# Patient Record
Sex: FEMALE | Race: WHITE | Employment: OTHER | URBAN - NONMETROPOLITAN AREA
[De-identification: names, ages, dates, MRNs, and addresses within clinical notes are randomized per-mention and may not be internally consistent; named-entity substitution may affect disease eponyms.]

---

## 2017-03-20 ENCOUNTER — HOSPITAL ENCOUNTER (OUTPATIENT)
Dept: LAB | Age: 62
Discharge: HOME | End: 2017-03-20
Attending: INTERNAL MEDICINE | Admitting: INTERNAL MEDICINE

## 2017-03-20 ENCOUNTER — OFFICE VISIT (OUTPATIENT)
Dept: CARDIOLOGY CLINIC | Age: 62
End: 2017-03-20

## 2017-03-20 VITALS
SYSTOLIC BLOOD PRESSURE: 110 MMHG | RESPIRATION RATE: 16 BRPM | BODY MASS INDEX: 24.43 KG/M2 | HEART RATE: 64 BPM | WEIGHT: 152 LBS | DIASTOLIC BLOOD PRESSURE: 74 MMHG | HEIGHT: 66 IN

## 2017-03-20 DIAGNOSIS — I10 ESSENTIAL HYPERTENSION: ICD-10-CM

## 2017-03-20 DIAGNOSIS — Z98.61 S/P PTCA (PERCUTANEOUS TRANSLUMINAL CORONARY ANGIOPLASTY): Primary | ICD-10-CM

## 2017-03-20 DIAGNOSIS — I70.0 ATHEROSCLEROSIS OF AORTIC ARCH (HCC): ICD-10-CM

## 2017-03-20 DIAGNOSIS — E78.2 MIXED HYPERLIPIDEMIA: ICD-10-CM

## 2017-03-20 LAB
ALBUMIN SERPL-MCNC: 4.2 GM/DL (ref 3.4–5)
ALP BLD-CCNC: 105 IU/L (ref 40–129)
ALT SERPL-CCNC: 11 U/L (ref 10–40)
ANION GAP SERPL CALCULATED.3IONS-SCNC: 8 MMOL/L (ref 4–16)
AST SERPL-CCNC: 16 IU/L (ref 15–37)
BILIRUB SERPL-MCNC: 0.3 MG/DL (ref 0–1)
BUN BLDV-MCNC: 11 MG/DL (ref 6–23)
CALCIUM SERPL-MCNC: 9.5 MG/DL (ref 8.3–10.6)
CHLORIDE BLD-SCNC: 102 MMOL/L (ref 99–110)
CHOLESTEROL: 149 MG/DL
CO2: 30 MMOL/L (ref 21–32)
CREAT SERPL-MCNC: 0.9 MG/DL (ref 0.6–1.1)
GFR AFRICAN AMERICAN: >60 ML/MIN/1.73M2
GFR NON-AFRICAN AMERICAN: >60 ML/MIN/1.73M2
GLUCOSE BLD-MCNC: 96 MG/DL (ref 70–140)
HDLC SERPL-MCNC: 58 MG/DL
LDL CHOLESTEROL DIRECT: 82 MG/DL
POTASSIUM SERPL-SCNC: 3.7 MMOL/L (ref 3.5–5.1)
SODIUM BLD-SCNC: 140 MMOL/L (ref 135–145)
TOTAL PROTEIN: 7.4 GM/DL (ref 6.4–8.2)
TRIGL SERPL-MCNC: 140 MG/DL

## 2017-03-20 PROCEDURE — 99214 OFFICE O/P EST MOD 30 MIN: CPT | Performed by: INTERNAL MEDICINE

## 2017-03-20 RX ORDER — SIMVASTATIN 40 MG
TABLET ORAL
Qty: 30 TABLET | Refills: 6 | Status: SHIPPED | OUTPATIENT
Start: 2017-03-20 | End: 2017-09-25 | Stop reason: SDUPTHER

## 2017-03-20 RX ORDER — LISINOPRIL 2.5 MG/1
TABLET ORAL
Qty: 30 TABLET | Refills: 6 | Status: SHIPPED | OUTPATIENT
Start: 2017-03-20 | End: 2017-09-25 | Stop reason: SDUPTHER

## 2017-09-25 ENCOUNTER — OFFICE VISIT (OUTPATIENT)
Dept: CARDIOLOGY CLINIC | Age: 62
End: 2017-09-25

## 2017-09-25 VITALS
RESPIRATION RATE: 16 BRPM | SYSTOLIC BLOOD PRESSURE: 120 MMHG | HEART RATE: 72 BPM | WEIGHT: 158 LBS | HEIGHT: 66 IN | DIASTOLIC BLOOD PRESSURE: 60 MMHG | BODY MASS INDEX: 25.39 KG/M2

## 2017-09-25 DIAGNOSIS — I10 ESSENTIAL HYPERTENSION: ICD-10-CM

## 2017-09-25 DIAGNOSIS — I25.10 CORONARY ARTERY DISEASE INVOLVING NATIVE CORONARY ARTERY OF NATIVE HEART WITHOUT ANGINA PECTORIS: ICD-10-CM

## 2017-09-25 DIAGNOSIS — E78.2 MIXED HYPERLIPIDEMIA: ICD-10-CM

## 2017-09-25 DIAGNOSIS — Z98.61 S/P PTCA (PERCUTANEOUS TRANSLUMINAL CORONARY ANGIOPLASTY): Primary | ICD-10-CM

## 2017-09-25 PROCEDURE — 99214 OFFICE O/P EST MOD 30 MIN: CPT | Performed by: INTERNAL MEDICINE

## 2017-09-25 RX ORDER — SIMVASTATIN 40 MG
TABLET ORAL
Qty: 90 TABLET | Refills: 3 | Status: SHIPPED | OUTPATIENT
Start: 2017-09-25 | End: 2018-10-08 | Stop reason: SDUPTHER

## 2017-09-25 RX ORDER — LISINOPRIL 2.5 MG/1
TABLET ORAL
Qty: 90 TABLET | Refills: 3 | Status: SHIPPED | OUTPATIENT
Start: 2017-09-25 | End: 2018-08-29 | Stop reason: SDUPTHER

## 2017-11-30 ENCOUNTER — OFFICE VISIT (OUTPATIENT)
Dept: FAMILY MEDICINE CLINIC | Age: 62
End: 2017-11-30

## 2017-11-30 VITALS
WEIGHT: 159 LBS | DIASTOLIC BLOOD PRESSURE: 78 MMHG | OXYGEN SATURATION: 98 % | TEMPERATURE: 98.1 F | BODY MASS INDEX: 25.66 KG/M2 | HEART RATE: 68 BPM | SYSTOLIC BLOOD PRESSURE: 114 MMHG | RESPIRATION RATE: 15 BRPM

## 2017-11-30 DIAGNOSIS — H65.02 ACUTE SEROUS OTITIS MEDIA OF LEFT EAR, RECURRENCE NOT SPECIFIED: Primary | ICD-10-CM

## 2017-11-30 PROCEDURE — 99213 OFFICE O/P EST LOW 20 MIN: CPT | Performed by: NURSE PRACTITIONER

## 2017-11-30 RX ORDER — AZITHROMYCIN 250 MG/1
TABLET, FILM COATED ORAL
Qty: 1 PACKET | Refills: 0 | Status: SHIPPED | OUTPATIENT
Start: 2017-11-30 | End: 2017-12-10

## 2017-11-30 RX ORDER — FLUTICASONE PROPIONATE 50 MCG
2 SPRAY, SUSPENSION (ML) NASAL DAILY
Qty: 1 BOTTLE | Refills: 0 | Status: SHIPPED | OUTPATIENT
Start: 2017-11-30 | End: 2018-11-05

## 2017-11-30 ASSESSMENT — PATIENT HEALTH QUESTIONNAIRE - PHQ9
1. LITTLE INTEREST OR PLEASURE IN DOING THINGS: 0
SUM OF ALL RESPONSES TO PHQ QUESTIONS 1-9: 0
SUM OF ALL RESPONSES TO PHQ9 QUESTIONS 1 & 2: 0

## 2017-11-30 ASSESSMENT — ENCOUNTER SYMPTOMS
GASTROINTESTINAL NEGATIVE: 1
DIARRHEA: 0
SINUS PRESSURE: 1
VOMITING: 0
COUGH: 0
SINUS PAIN: 1
RESPIRATORY NEGATIVE: 1
SORE THROAT: 1
ABDOMINAL PAIN: 0

## 2018-06-12 ENCOUNTER — TELEPHONE (OUTPATIENT)
Dept: FAMILY MEDICINE CLINIC | Age: 63
End: 2018-06-12

## 2018-08-29 RX ORDER — LISINOPRIL 2.5 MG/1
TABLET ORAL
Qty: 90 TABLET | Refills: 3 | Status: SHIPPED | OUTPATIENT
Start: 2018-08-29 | End: 2018-11-05 | Stop reason: SDUPTHER

## 2018-10-08 RX ORDER — SIMVASTATIN 40 MG
TABLET ORAL
Qty: 30 TABLET | Refills: 0 | Status: SHIPPED | OUTPATIENT
Start: 2018-10-08 | End: 2018-11-05 | Stop reason: SDUPTHER

## 2018-11-05 ENCOUNTER — OFFICE VISIT (OUTPATIENT)
Dept: CARDIOLOGY CLINIC | Age: 63
End: 2018-11-05
Payer: COMMERCIAL

## 2018-11-05 VITALS
BODY MASS INDEX: 25.71 KG/M2 | WEIGHT: 160 LBS | HEART RATE: 78 BPM | HEIGHT: 66 IN | DIASTOLIC BLOOD PRESSURE: 72 MMHG | SYSTOLIC BLOOD PRESSURE: 110 MMHG

## 2018-11-05 DIAGNOSIS — Z98.61 S/P PTCA (PERCUTANEOUS TRANSLUMINAL CORONARY ANGIOPLASTY): Primary | ICD-10-CM

## 2018-11-05 DIAGNOSIS — I10 ESSENTIAL HYPERTENSION: ICD-10-CM

## 2018-11-05 DIAGNOSIS — E78.2 MIXED HYPERLIPIDEMIA: ICD-10-CM

## 2018-11-05 PROCEDURE — 99214 OFFICE O/P EST MOD 30 MIN: CPT | Performed by: INTERNAL MEDICINE

## 2018-11-05 RX ORDER — LISINOPRIL 2.5 MG/1
TABLET ORAL
Qty: 90 TABLET | Refills: 3 | OUTPATIENT
Start: 2018-11-05

## 2018-11-05 RX ORDER — LISINOPRIL 2.5 MG/1
2.5 TABLET ORAL DAILY
Qty: 90 TABLET | Refills: 3 | Status: SHIPPED | OUTPATIENT
Start: 2018-11-05 | End: 2019-10-28 | Stop reason: SDUPTHER

## 2018-11-05 RX ORDER — SIMVASTATIN 40 MG
TABLET ORAL
Qty: 90 TABLET | Refills: 3 | Status: SHIPPED | OUTPATIENT
Start: 2018-11-05 | End: 2019-10-28 | Stop reason: SDUPTHER

## 2018-11-05 NOTE — PROGRESS NOTES
Gokul Mo  1955  Moises Sharp MD    Chief complaint and HPI:  Gokul Mo  78-year-old female follows up for coronary artery disease, hypertension and hyperlipidemia. She denies any chest pains. She walks briskly whenever she walks and she worked as a  and requires quite a bit of walking  at work. she is not a smoker. She never had chest pain. Her presenting symptoms prior to coronary intervention was shortness of breath on walking flights of stairs which she does it without any symptoms now. She has declined stress test in the past.  She continues to decline it as she is feeling great. All her medications are reviewed. She had not had lipids done for over a year. She seems to be compliant to her medications. Rest of the Cardiovascular system review is otherwise unchanged from prior encounter. Past medical history:  has a past medical history of Anxiety; CAD (coronary artery disease); H/O cardiovascular stress test; H/O echocardiogram; H/O echocardiogram; Hyperlipidemia; Hypertension; MI (myocardial infarction) ([address] S/P PTCA (percutaneous transluminal coronary angioplasty); and Tobacco abuse. Past surgical history:  has a past surgical history that includes Coronary angioplasty with stent; Coronary angioplasty; Hysterectomy; Tubal ligation; Diagnostic Cardiac Cath Lab Procedure; and Hemorrhoid surgery. Social History:   Social History   Substance Use Topics    Smoking status: Former Smoker     Packs/day: 0.50     Years: 25.00     Types: Cigarettes     Start date: 5/6/2014     Quit date: 9/30/2016    Smokeless tobacco: Never Used      Comment: Currently using vapor        Reviewed 9/25/17    Alcohol use Yes      Comment: occasionally     Family history: family history includes Substance Abuse in her mother. ALLERGIES:  Penicillins  Prior to Admission medications    Medication Sig Start Date End Date Taking?  Authorizing Provider   simvastatin (ZOCOR) 40 MG tablet

## 2018-11-05 NOTE — PATIENT INSTRUCTIONS
Continue current cardiovascular medications which have been reviewed and discussed individually with you. Continue to walk for exercise. Repeat lipids. Primary/secondary prevention is the goal by aggressive risk modification, healthy and therapeutic life style changes for cardiovascular risk reduction. Various goals are discussed and questions answered. Declines stress test for now. Appropriate prescriptions if needed on this visit are addressed. After visit summery is provided. Questions answered and patient verbalizes understanding. Follow up in office in 12 months with ECG, sooner if needed.

## 2019-04-24 ENCOUNTER — TELEPHONE (OUTPATIENT)
Dept: FAMILY MEDICINE CLINIC | Age: 64
End: 2019-04-24

## 2019-04-24 NOTE — TELEPHONE ENCOUNTER
Attempted to contact pt regarding scheduling a nurse visit for pneumococcal vaccine. No answer, phone just rang busy.

## 2019-10-03 ENCOUNTER — HOSPITAL ENCOUNTER (EMERGENCY)
Age: 64
Discharge: HOME | End: 2019-10-03
Attending: EMERGENCY MEDICINE
Payer: COMMERCIAL

## 2019-10-03 VITALS
HEART RATE: 101 BPM | TEMPERATURE: 99.3 F | RESPIRATION RATE: 18 BRPM | OXYGEN SATURATION: 97 % | SYSTOLIC BLOOD PRESSURE: 177 MMHG | HEIGHT: 66 IN | WEIGHT: 168 LBS | BODY MASS INDEX: 27 KG/M2 | DIASTOLIC BLOOD PRESSURE: 95 MMHG

## 2019-10-03 DIAGNOSIS — S61.112A LACERATION OF LEFT THUMB WITHOUT FOREIGN BODY WITH DAMAGE TO NAIL, INITIAL ENCOUNTER: Primary | ICD-10-CM

## 2019-10-03 PROCEDURE — 6360000002 HC RX W HCPCS: Performed by: EMERGENCY MEDICINE

## 2019-10-03 PROCEDURE — 90715 TDAP VACCINE 7 YRS/> IM: CPT | Performed by: EMERGENCY MEDICINE

## 2019-10-03 PROCEDURE — 90471 IMMUNIZATION ADMIN: CPT | Performed by: EMERGENCY MEDICINE

## 2019-10-03 PROCEDURE — 4500000027

## 2019-10-03 PROCEDURE — 2500000003 HC RX 250 WO HCPCS: Performed by: EMERGENCY MEDICINE

## 2019-10-03 PROCEDURE — 99282 EMERGENCY DEPT VISIT SF MDM: CPT

## 2019-10-03 RX ORDER — DIAPER,BRIEF,INFANT-TODD,DISP
EACH MISCELLANEOUS ONCE
Status: DISCONTINUED | OUTPATIENT
Start: 2019-10-03 | End: 2019-10-03 | Stop reason: HOSPADM

## 2019-10-03 RX ORDER — BUPIVACAINE HYDROCHLORIDE 5 MG/ML
5 INJECTION, SOLUTION EPIDURAL; INTRACAUDAL ONCE
Status: COMPLETED | OUTPATIENT
Start: 2019-10-03 | End: 2019-10-03

## 2019-10-03 RX ADMIN — TETANUS TOXOID, REDUCED DIPHTHERIA TOXOID AND ACELLULAR PERTUSSIS VACCINE, ADSORBED 0.5 ML: 5; 2.5; 8; 8; 2.5 SUSPENSION INTRAMUSCULAR at 20:08

## 2019-10-03 ASSESSMENT — PAIN SCALES - GENERAL
PAINLEVEL_OUTOF10: 0
PAINLEVEL_OUTOF10: 7

## 2019-10-03 ASSESSMENT — PAIN DESCRIPTION - LOCATION: LOCATION: FINGER (COMMENT WHICH ONE)

## 2019-10-03 ASSESSMENT — PAIN DESCRIPTION - DESCRIPTORS: DESCRIPTORS: THROBBING

## 2019-10-03 ASSESSMENT — PAIN DESCRIPTION - FREQUENCY: FREQUENCY: CONTINUOUS

## 2019-10-03 ASSESSMENT — PAIN DESCRIPTION - PAIN TYPE: TYPE: ACUTE PAIN

## 2019-10-28 RX ORDER — SIMVASTATIN 40 MG
TABLET ORAL
Qty: 30 TABLET | Refills: 11 | Status: SHIPPED | OUTPATIENT
Start: 2019-10-28 | End: 2020-10-20 | Stop reason: SDUPTHER

## 2019-10-28 RX ORDER — LISINOPRIL 2.5 MG/1
TABLET ORAL
Qty: 30 TABLET | Refills: 11 | Status: SHIPPED | OUTPATIENT
Start: 2019-10-28 | End: 2020-10-26 | Stop reason: SDUPTHER

## 2019-11-04 ENCOUNTER — OFFICE VISIT (OUTPATIENT)
Dept: CARDIOLOGY CLINIC | Age: 64
End: 2019-11-04
Payer: COMMERCIAL

## 2019-11-04 VITALS
WEIGHT: 165 LBS | SYSTOLIC BLOOD PRESSURE: 110 MMHG | DIASTOLIC BLOOD PRESSURE: 70 MMHG | BODY MASS INDEX: 26.52 KG/M2 | HEART RATE: 80 BPM | HEIGHT: 66 IN

## 2019-11-04 DIAGNOSIS — I25.10 CORONARY ARTERY DISEASE INVOLVING NATIVE CORONARY ARTERY OF NATIVE HEART WITHOUT ANGINA PECTORIS: ICD-10-CM

## 2019-11-04 DIAGNOSIS — I20.8 ANGINA AT REST (HCC): ICD-10-CM

## 2019-11-04 DIAGNOSIS — I10 ESSENTIAL HYPERTENSION: ICD-10-CM

## 2019-11-04 DIAGNOSIS — Z98.61 S/P PTCA (PERCUTANEOUS TRANSLUMINAL CORONARY ANGIOPLASTY): Primary | ICD-10-CM

## 2019-11-04 DIAGNOSIS — Z72.0 TOBACCO ABUSE: ICD-10-CM

## 2019-11-04 DIAGNOSIS — E78.2 MIXED HYPERLIPIDEMIA: ICD-10-CM

## 2019-11-04 PROCEDURE — 93000 ELECTROCARDIOGRAM COMPLETE: CPT | Performed by: INTERNAL MEDICINE

## 2019-11-04 PROCEDURE — 99214 OFFICE O/P EST MOD 30 MIN: CPT | Performed by: INTERNAL MEDICINE

## 2019-11-04 RX ORDER — NITROGLYCERIN 0.4 MG/1
0.4 TABLET SUBLINGUAL EVERY 5 MIN PRN
Qty: 25 TABLET | Refills: 3 | Status: SHIPPED | OUTPATIENT
Start: 2019-11-04

## 2020-05-04 ENCOUNTER — TELEMEDICINE (OUTPATIENT)
Dept: CARDIOLOGY CLINIC | Age: 65
End: 2020-05-04
Payer: COMMERCIAL

## 2020-05-04 VITALS — BODY MASS INDEX: 25.71 KG/M2 | WEIGHT: 160 LBS | HEIGHT: 66 IN

## 2020-05-04 PROCEDURE — 99214 OFFICE O/P EST MOD 30 MIN: CPT | Performed by: INTERNAL MEDICINE

## 2020-05-04 NOTE — ASSESSMENT & PLAN NOTE
Well controlled on current medications, reviewed individually with patient. Had not had lipids done since 2017. I have placed our nurse in the system and she will have it done as soon as possible.

## 2020-05-04 NOTE — PROGRESS NOTES
2020    TELEHEALTH EVALUATION -- Audio/Visual (During YDMLN-78 public health emergency)    HPI:  William Valenzuela (:  1955) has requested an audio/video evaluation for coronary artery disease, hyper lipidemia and hypertension and tobacco abuse. She reports she quit smoking 3 weeks ago after having a spell of common cold. She has done this before and went back on it and she is hoping that she doesn't relapse. Denies any chest pain or shortness of breath. Denies any fevers chills. Has been compliant to her medications. She has retired since last visit. All her medications are reviewed. Prior to Visit Medications    Medication Sig Taking? Authorizing Provider   metoprolol tartrate (LOPRESSOR) 25 MG tablet TAKE 1/2 TABLET BY MOUTH TWICE A DAY.  Yes Ami Falling, MD   nitroGLYCERIN (NITROSTAT) 0.4 MG SL tablet Place 1 tablet under the tongue every 5 minutes as needed for Chest pain Yes Ami Falling, MD   lisinopril (PRINIVIL;ZESTRIL) 2.5 MG tablet TAKE 1 TABLET BY MOUTH EVERY DAY Yes Ami Falling, MD   simvastatin (ZOCOR) 40 MG tablet TAKE 1 TABLET BY MOUTH EVERY DAY AT NIGHT Yes Ami Falling, MD   aspirin 81 MG tablet Take 162 mg by mouth daily Yes Historical Provider, MD     Social History     Tobacco Use    Smoking status: Current Every Day Smoker     Packs/day: 0.50     Years: 25.00     Pack years: 12.50     Types: Cigarettes     Start date: 2014     Last attempt to quit: 2016     Years since quitting: 3.5    Smokeless tobacco: Never Used    Tobacco comment: Currently using vapor        Reviewed 17   Substance Use Topics    Alcohol use: Yes     Comment: occasionally     PHYSICAL EXAMINATION:  [ INSTRUCTIONS:  \"[x]\" Indicates a positive item  \"[]\" Indicates a negative item  -- DELETE ALL ITEMS NOT EXAMINED]  Vital Signs: (As obtained by patient/caregiver or practitioner observation)  Ht 5' 6\" (1.676 m)   Wt 160 lb (72.6 kg)   BMI 25.82 kg/m²       Wt Readings from

## 2020-10-20 RX ORDER — SIMVASTATIN 40 MG
TABLET ORAL
Qty: 30 TABLET | Refills: 6 | Status: SHIPPED | OUTPATIENT
Start: 2020-10-20 | End: 2021-04-20

## 2020-10-20 NOTE — TELEPHONE ENCOUNTER
eRx to [facility], [place]:  Simvastatin 40 mg qd #30 Rx6 pended and routed to Dr. Benjamin Alberto for approval.

## 2020-10-26 RX ORDER — LISINOPRIL 2.5 MG/1
TABLET ORAL
Qty: 30 TABLET | Refills: 11 | Status: SHIPPED | OUTPATIENT
Start: 2020-10-26 | End: 2021-09-20

## 2020-11-09 ENCOUNTER — TELEMEDICINE (OUTPATIENT)
Dept: CARDIOLOGY CLINIC | Age: 65
End: 2020-11-09
Payer: COMMERCIAL

## 2020-11-09 PROCEDURE — 99214 OFFICE O/P EST MOD 30 MIN: CPT | Performed by: INTERNAL MEDICINE

## 2020-11-09 NOTE — ASSESSMENT & PLAN NOTE
Patient is not monitoring blood pressure at home. Start monitoring blood pressure. Goal is to keep it below 140/90.

## 2020-11-09 NOTE — ASSESSMENT & PLAN NOTE
We do not have any recent lipid analysis results on her. Patient is taking pravastatin. I would order lipid analysis and LFTs in chart check. Continue current lipid-lowering therapy for now. LDL goal is less than 70.

## 2020-11-09 NOTE — PATIENT INSTRUCTIONS
Continue current cardiovascular medications which have been reviewed and discussed individually with you. Counseled for regular exercise. Primary/secondary prevention is the goal by aggressive risk modification, healthy and therapeutic life style changes for cardiovascular risk reduction. Various goals are discussed and questions answered. Multiple questions are answered and patient verbalized understanding. Follow up office visit in 6 months, sooner if needed.

## 2020-11-09 NOTE — PROGRESS NOTES
Packs/day: 0.50     Years: 25.00     Pack years: 12.50     Types: Cigarettes     Start date: 2014     Last attempt to quit: 2016     Years since quittin.1    Smokeless tobacco: Never Used    Tobacco comment: Currently using vapor        Reviewed 17   Substance Use Topics    Alcohol use: Yes     Comment: occasionally     PHYSICAL EXAMINATION:  [ INSTRUCTIONS:  \"[x]\" Indicates a positive item  \"[]\" Indicates a negative item  -- DELETE ALL ITEMS NOT EXAMINED]  Vital Signs: (As obtained by patient/caregiver or practitioner observation)  Patient-Reported Vitals 2020   Patient-Reported Weight 160 lbs   Patient-Reported Height 5 6       Wt Readings from Last 3 Encounters:   20 160 lb (72.6 kg)   19 165 lb (74.8 kg)   10/03/19 168 lb (76.2 kg)     Constitutional: [x] Appears well-developed and well-nourished [x] No apparent distress      [] Abnormal-   Mental status  [x] Alert and awake  [x] Oriented to person/place/time []Able to follow commands      Eyes:  EOM    [x]  Normal  [] Abnormal-  Sclera  []  Normal  [] Abnormal -         Discharge []  None visible  [] Abnormal -    HENT:   [x] Normocephalic, atraumatic.   [] Abnormal   [] Mouth/Throat: Mucous membranes are moist.     External Ears [x] Normal  [] Abnormal-     Pulmonary/Chest: [x] Respiratory effort normal.  [] No visualized signs of difficulty breathing or respiratory distress        [] Abnormal-      Neurological:        [x] No Facial Asymmetry (Cranial nerve 7 motor function) (limited exam to video visit)          [] No gaze palsy        [] Abnormal-         Skin:        [x] No significant exanthematous lesions or discoloration noted on facial skin         [] Abnormal-            Psychiatric:       [x] Normal Affect [] No Hallucinations        [] Abnormal-   Other pertinent observable physical exam findings-     Due to this being a TeleHealth encounter, evaluation of the following organ systems is limited: the patient's risk of exposure to COVID-19 and provide continuity of care for an established patient. Lipid analysis and LFT. Services were provided through a video synchronous discussion virtually to substitute for in-person clinic visit.

## 2021-04-20 NOTE — TELEPHONE ENCOUNTER
Pt given 30 day supply and informed that they need labs done before next ov 5/3/2021. Pt agreed to have labs done.

## 2021-04-21 RX ORDER — SIMVASTATIN 40 MG
TABLET ORAL
Qty: 30 TABLET | Refills: 0 | Status: SHIPPED | OUTPATIENT
Start: 2021-04-21 | End: 2021-05-17

## 2021-04-30 ENCOUNTER — HOSPITAL ENCOUNTER (OUTPATIENT)
Age: 66
Discharge: HOME | End: 2021-04-30

## 2021-04-30 LAB
ALBUMIN SERPL-MCNC: 4 GM/DL (ref 3.4–5)
ALP BLD-CCNC: 121 IU/L (ref 40–129)
ALT SERPL-CCNC: 8 U/L (ref 10–40)
AST SERPL-CCNC: 14 IU/L (ref 15–37)
BILIRUB SERPL-MCNC: 0.6 MG/DL (ref 0–1)
BILIRUBIN DIRECT: 0.2 MG/DL (ref 0–0.3)
BILIRUBIN, INDIRECT: 0.4 MG/DL (ref 0–0.7)
CHOLESTEROL, FASTING: 145 MG/DL
HDLC SERPL-MCNC: 40 MG/DL
LDL CHOLESTEROL DIRECT: 89 MG/DL
TOTAL PROTEIN: 7.3 GM/DL (ref 6.4–8.2)
TRIGLYCERIDE, FASTING: 144 MG/DL

## 2021-04-30 PROCEDURE — 80076 HEPATIC FUNCTION PANEL: CPT

## 2021-04-30 PROCEDURE — 36415 COLL VENOUS BLD VENIPUNCTURE: CPT

## 2021-04-30 PROCEDURE — 80061 LIPID PANEL: CPT

## 2021-05-10 ENCOUNTER — TELEPHONE (OUTPATIENT)
Dept: CARDIOLOGY CLINIC | Age: 66
End: 2021-05-10

## 2021-05-10 NOTE — TELEPHONE ENCOUNTER
I called patient to let her know she missed ov and to have her reschedule appointment. I had to leave message for patient to call the office.

## 2021-05-17 RX ORDER — SIMVASTATIN 40 MG
TABLET ORAL
Qty: 30 TABLET | Refills: 5 | Status: SHIPPED | OUTPATIENT
Start: 2021-05-17 | End: 2021-09-20

## 2021-09-20 RX ORDER — SIMVASTATIN 40 MG
TABLET ORAL
Qty: 90 TABLET | Refills: 0 | Status: SHIPPED | OUTPATIENT
Start: 2021-09-20 | End: 2021-10-12 | Stop reason: ALTCHOICE

## 2021-09-20 RX ORDER — LISINOPRIL 2.5 MG/1
TABLET ORAL
Qty: 90 TABLET | Refills: 0 | Status: SHIPPED | OUTPATIENT
Start: 2021-09-20 | End: 2021-12-13

## 2021-09-20 NOTE — TELEPHONE ENCOUNTER
Will pend prescription to Dr Lizzy Iglesias to review and approve. Patient last visit was VV 11/2020. Patient cancelled last office visit due to no longer having insurance. Did reschedule office visit and patient will stop in the office to  financial assistance form prior to appointment.

## 2021-10-12 ENCOUNTER — OFFICE VISIT (OUTPATIENT)
Dept: CARDIOLOGY CLINIC | Age: 66
End: 2021-10-12

## 2021-10-12 VITALS
HEART RATE: 79 BPM | BODY MASS INDEX: 24.27 KG/M2 | HEIGHT: 66 IN | DIASTOLIC BLOOD PRESSURE: 82 MMHG | WEIGHT: 151 LBS | SYSTOLIC BLOOD PRESSURE: 136 MMHG

## 2021-10-12 DIAGNOSIS — Z72.0 TOBACCO ABUSE: ICD-10-CM

## 2021-10-12 DIAGNOSIS — I10 PRIMARY HYPERTENSION: ICD-10-CM

## 2021-10-12 DIAGNOSIS — E78.00 PURE HYPERCHOLESTEROLEMIA: ICD-10-CM

## 2021-10-12 DIAGNOSIS — R63.4 WEIGHT LOSS, UNINTENTIONAL: ICD-10-CM

## 2021-10-12 DIAGNOSIS — Z98.61 S/P PTCA (PERCUTANEOUS TRANSLUMINAL CORONARY ANGIOPLASTY): ICD-10-CM

## 2021-10-12 DIAGNOSIS — I25.10 CORONARY ARTERY DISEASE INVOLVING NATIVE HEART WITHOUT ANGINA PECTORIS, UNSPECIFIED VESSEL OR LESION TYPE: Primary | ICD-10-CM

## 2021-10-12 PROCEDURE — 99214 OFFICE O/P EST MOD 30 MIN: CPT | Performed by: INTERNAL MEDICINE

## 2021-10-12 PROCEDURE — 93000 ELECTROCARDIOGRAM COMPLETE: CPT | Performed by: INTERNAL MEDICINE

## 2021-10-12 RX ORDER — ATORVASTATIN CALCIUM 80 MG/1
80 TABLET, FILM COATED ORAL DAILY
Qty: 90 TABLET | Refills: 1 | Status: SHIPPED | OUTPATIENT
Start: 2021-10-12 | End: 2022-04-11 | Stop reason: SDUPTHER

## 2021-10-12 NOTE — PATIENT INSTRUCTIONS
Change Simvastatin to Lipitor 80 mg daily if tolerated and repeat lipids and LFT in 2-3 months. LDL goal is below 70. Primary/secondary prevention is the goal by aggressive risk modification, healthy and therapeutic life style changes for cardiovascular risk reduction. Various goals are discussed and questions answered. Continue other current cardiovascular medications which have been reviewed and discussed individually with you. Follow up with PCP for unintentional weight loss.  Follow-up in 6 months, sooner if needed

## 2021-10-12 NOTE — PROGRESS NOTES
Talia Crawford (:  1955) is a 74 y.o. female,     Chief Complaint   Patient presents with    Coronary Artery Disease     OV for 1 year check. Pt denies any chest pain,SOB,dizziness,swelling to ankles, or palpitations.  Hypertension    Hyperlipidemia     Patient is here for follow up for known premature coronary artery disease status post multivessel interventions in  has hypertension hyperlipidemia and tobacco abuse. She denies any chest pain or any cardiac symptoms. She continues to smoke half a pack of cigarettes a day and has not taken vaccination for COVID-19. She is taking care of her 80year-old father and it has been under stress because he is  and extremely hard of hearing. Medications are reviewed and she is compliant to her medications. Allergies   Allergen Reactions    Penicillins Anaphylaxis and Shortness Of Breath     Prior to Admission medications    Medication Sig Start Date End Date Taking?  Authorizing Provider   atorvastatin (LIPITOR) 80 MG tablet Take 1 tablet by mouth daily 10/12/21  Yes Osbaldo Robert MD   lisinopril (PRINIVIL;ZESTRIL) 2.5 MG tablet TAKE 1 TABLET BY MOUTH EVERY DAY 21  Yes Osbaldo Robert MD   metoprolol tartrate (LOPRESSOR) 25 MG tablet TAKE 1/2 TABLET BY MOUTH TWICE A DAY 21  Yes Osbaldo Robert MD   nitroGLYCERIN (NITROSTAT) 0.4 MG SL tablet Place 1 tablet under the tongue every 5 minutes as needed for Chest pain 19  Yes Osbaldo Robert MD   aspirin 81 MG tablet Take 162 mg by mouth daily   Yes Historical Provider, MD     Past Medical History:   Diagnosis Date    Anxiety     CAD (coronary artery disease)     H/O cardiovascular stress test 2016    WNL EF 70%    H/O echocardiogram 1/28/10    WNL EF 55-60%    H/O echocardiogram 2016    AV sclerosis without stenosis    Hyperlipidemia     Hypertension     MI (myocardial infarction) ([facility] [address]     S/P PTCA (percutaneous transluminal coronary angioplasty) modification for secondary prevention. Last stress test was September 2016. Guidelines recommend direct evaluation once in 5 years however because of insurance she did not want the test done. 3. Tobacco abuse  Assessment & Plan:  Patient is counseled for smoking cessation and refraining from passive nicotine exposure. I have offered multiple approaches and support to accomplish this. 4. Pure hypercholesterolemia  Assessment & Plan:  Change Simvastatin to Lipitor 80 mg daily if tolerated and repeat lipids and LFT in 2-3 months. LDL goal is below 70. Orders:  -     Lipid Panel; Standing  -     Hepatic Function Panel; Future  5. Primary hypertension  Assessment & Plan:  Well controlled on current medications, reviewed individually with patient. 6. Weight loss, unintentional  Assessment & Plan:  Follow up with PCP for unintentional weight loss     Change Simvastatin to Lipitor 80 mg daily if tolerated and repeat lipids and LFT in 2-3 months. LDL goal is below 70. Primary/secondary prevention is the goal by aggressive risk modification, healthy and therapeutic life style changes for cardiovascular risk reduction. Various goals are discussed and questions answered. Continue other current cardiovascular medications which have been reviewed and discussed individually with you. Follow up with PCP for unintentional weight loss. Follow-up in 6 months, sooner if needed. An electronic signature was used to authenticate this note.     --Marlen Sousa MD

## 2021-10-12 NOTE — ASSESSMENT & PLAN NOTE
Clinically stable. Continue aggressive risk factor modification for secondary prevention. Last stress test was September 2016. Guidelines recommend direct evaluation once in 5 years however because of insurance she did not want the test done.

## 2021-12-13 RX ORDER — LISINOPRIL 2.5 MG/1
TABLET ORAL
Qty: 90 TABLET | Refills: 0 | Status: SHIPPED | OUTPATIENT
Start: 2021-12-13 | End: 2022-03-10

## 2021-12-13 NOTE — TELEPHONE ENCOUNTER
Will pend lisinopril prescription TO Dr Fernando Robbins to review and approve. Follow up scheduled 4/2022.

## 2022-03-10 RX ORDER — LISINOPRIL 2.5 MG/1
TABLET ORAL
Qty: 90 TABLET | Refills: 0 | Status: SHIPPED | OUTPATIENT
Start: 2022-03-10 | End: 2022-04-11 | Stop reason: SDUPTHER

## 2022-04-11 ENCOUNTER — OFFICE VISIT (OUTPATIENT)
Dept: CARDIOLOGY CLINIC | Age: 67
End: 2022-04-11

## 2022-04-11 VITALS
SYSTOLIC BLOOD PRESSURE: 124 MMHG | WEIGHT: 143 LBS | HEART RATE: 66 BPM | HEIGHT: 67 IN | BODY MASS INDEX: 22.44 KG/M2 | DIASTOLIC BLOOD PRESSURE: 68 MMHG

## 2022-04-11 DIAGNOSIS — E78.00 PURE HYPERCHOLESTEROLEMIA: Primary | ICD-10-CM

## 2022-04-11 DIAGNOSIS — R63.4 ABNORMAL WEIGHT LOSS: ICD-10-CM

## 2022-04-11 DIAGNOSIS — I70.0 ATHEROSCLEROSIS OF AORTIC ARCH (HCC): ICD-10-CM

## 2022-04-11 DIAGNOSIS — I10 PRIMARY HYPERTENSION: ICD-10-CM

## 2022-04-11 DIAGNOSIS — Z98.61 S/P PTCA (PERCUTANEOUS TRANSLUMINAL CORONARY ANGIOPLASTY): ICD-10-CM

## 2022-04-11 DIAGNOSIS — Z72.0 TOBACCO ABUSE: ICD-10-CM

## 2022-04-11 PROCEDURE — 99214 OFFICE O/P EST MOD 30 MIN: CPT | Performed by: INTERNAL MEDICINE

## 2022-04-11 RX ORDER — LISINOPRIL 2.5 MG/1
2.5 TABLET ORAL DAILY
Qty: 90 TABLET | Refills: 1 | Status: SHIPPED | OUTPATIENT
Start: 2022-04-11

## 2022-04-11 RX ORDER — ATORVASTATIN CALCIUM 80 MG/1
80 TABLET, FILM COATED ORAL DAILY
Qty: 90 TABLET | Refills: 1 | Status: SHIPPED | OUTPATIENT
Start: 2022-04-11 | End: 2022-10-17 | Stop reason: SDUPTHER

## 2022-04-11 NOTE — PROGRESS NOTES
Allison Velázquez (:  1955) is a 69 y.o. female,     Chief Complaint   Patient presents with    Hyperlipidemia     Denies any new onset cardiac complaints. For exercise has a new puppy. Drinks 1 cup coffee daily, 2 sodas a day. Former smoker smokes half a pack a day. Discuss medications.  Hypertension    Coronary Artery Disease     Patient is here for follow up for coronary artery disease and hypertension hyperlipidemia and ongoing tobacco use. She denies any cardiac symptoms. She continues to smoke half a pack of cigarettes a day. Currently there is no regular exercise program in her day-to-day activities however she has a plan to start walking the dog. Medications reviewed and discussed with her. She has been compliant with medications however she reports her atorvastatin is costing $80 for 3-month supply. Will looked in to [facility] prices and it should block less than that according to what I see. Allergies   Allergen Reactions    Penicillins Anaphylaxis and Shortness Of Breath     Prior to Admission medications    Medication Sig Start Date End Date Taking?  Authorizing Provider   atorvastatin (LIPITOR) 80 MG tablet Take 1 tablet by mouth daily 22  Yes Leigha Jackson MD   lisinopril (PRINIVIL;ZESTRIL) 2.5 MG tablet Take 1 tablet by mouth daily 22  Yes Leigha Jackson MD   metoprolol tartrate (LOPRESSOR) 25 MG tablet Take 0.5 tablets by mouth 2 times daily 22  Yes Leigha Jackson MD   nitroGLYCERIN (NITROSTAT) 0.4 MG SL tablet Place 1 tablet under the tongue every 5 minutes as needed for Chest pain 19  Yes Leigha Jackson MD   aspirin 81 MG tablet Take 162 mg by mouth daily   Yes Historical Provider, MD     Past Medical History:   Diagnosis Date    Anxiety     CAD (coronary artery disease)     H/O cardiovascular stress test 2016    WNL EF 70%    H/O echocardiogram 1/28/10    WNL EF 55-60%    H/O echocardiogram 2016    AV sclerosis without stenosis    Hyperlipidemia     Hypertension     [facility]) ([facility] [address]     S/P PTCA (percutaneous transluminal coronary angioplasty) 12/24/2009 2009 stent to the LAD and CX     Tobacco abuse 3/11/2016      Vitals:    04/11/22 1128   BP: 124/68   Pulse: 66   Weight: 143 lb (64.9 kg)   Height: 5' 7.2\" (1.707 m)      Body mass index is 22.26 kg/m². Wt Readings from Last 3 Encounters:   04/11/22 143 lb (64.9 kg)   10/12/21 151 lb (68.5 kg)   05/04/20 160 lb (72.6 kg)     Constitutional:  Patient is normally built and nourished female in no apparent distress. She lost 8 pounds since last visit 6 months ago. HEENT: Wearing glasses and facemask. Cardiovascular: Auscultation: Normal S1 and S2. No murmurs or gallops noted. Carotids are negative for bruits. Respiratory:  Respiratory effort is normal. Breath sounds are clear to auscultation. Extremities:  No edema, clubbing,  Cyanosis, petechiae. Abdomen:  No masses or tenderness. No organomegaly noted. Neurologic:  Oriented to time, place, and person and non-anxious. No focal neurological deficit noted. Psychiatric: Normal mood and effect. Pertinent records reviewed and discussed with patient and results are as follow:    Lab Results   Component Value Date    WBC 6.7 09/02/2016    HGB 12.2 09/02/2016    HCT 39.0 09/02/2016     09/02/2016     Lab Results   Component Value Date    CHOL 149 03/20/2017    CHOLFAST 145 04/30/2021    TRIG 140 03/20/2017    TRIGLYCFAST 144 04/30/2021    HDL 40 (L) 04/30/2021    LDLDIRECT 89 04/30/2021     Lab Results   Component Value Date    BUN 11 03/20/2017    CREATININE 0.9 03/20/2017     03/20/2017    K 3.7 03/20/2017     Lab Results   Component Value Date    INR 0.94 01/17/2013     ASSESSMENT/PLAN:    1. Pure hypercholesterolemia  2. Primary hypertension  3. Atherosclerosis of aortic arch ([facility] [address]  4. S/P LAD and Cx stents 2009  5. Tobacco abuse  6. Abnormal weight loss      LDL was 89 last year.   Repeat lipids and continue Lipitor 80 mg daily. LDL goal is less than 70. Blood pressure is well controlled. Continue current cardiovascular medications which have been reviewed and discussed individually with you. Patient is counseled for smoking cessation and refraining from passive nicotine exposure. I have offered multiple approaches and support to accomplish this. Primary/secondary prevention is the goal by aggressive risk modification, healthy and therapeutic life style changes for cardiovascular risk reduction. Various goals are discussed and questions answered. Follow up with PCP for weight loss and annual wellness check. Follow-up in 6 months, sooner if needed. An electronic signature was used to authenticate this note.     --Rtia Ware MD

## 2022-04-11 NOTE — PATIENT INSTRUCTIONS
Continue current cardiovascular medications which have been reviewed and discussed individually with you. Patient is counseled for smoking cessation and refraining from passive nicotine exposure. I have offered multiple approaches and support to accomplish this. Primary/secondary prevention is the goal by aggressive risk modification, healthy and therapeutic life style changes for cardiovascular risk reduction. Various goals are discussed and questions answered. Follow up with PCP for weight loss and annual wellness check. Follow-up in 6 months, sooner if needed.

## 2022-04-14 RX ORDER — ATORVASTATIN CALCIUM 80 MG/1
TABLET, FILM COATED ORAL
Qty: 90 TABLET | Refills: 1 | OUTPATIENT
Start: 2022-04-14

## 2022-10-17 ENCOUNTER — OFFICE VISIT (OUTPATIENT)
Dept: CARDIOLOGY CLINIC | Age: 67
End: 2022-10-17
Payer: MEDICARE

## 2022-10-17 VITALS
HEIGHT: 67 IN | HEART RATE: 75 BPM | BODY MASS INDEX: 22.44 KG/M2 | DIASTOLIC BLOOD PRESSURE: 64 MMHG | SYSTOLIC BLOOD PRESSURE: 118 MMHG | WEIGHT: 143 LBS

## 2022-10-17 DIAGNOSIS — I10 PRIMARY HYPERTENSION: Primary | ICD-10-CM

## 2022-10-17 DIAGNOSIS — Z72.0 TOBACCO ABUSE: ICD-10-CM

## 2022-10-17 DIAGNOSIS — E78.00 PURE HYPERCHOLESTEROLEMIA: ICD-10-CM

## 2022-10-17 DIAGNOSIS — Z98.61 S/P PTCA (PERCUTANEOUS TRANSLUMINAL CORONARY ANGIOPLASTY): ICD-10-CM

## 2022-10-17 PROCEDURE — 4004F PT TOBACCO SCREEN RCVD TLK: CPT | Performed by: INTERNAL MEDICINE

## 2022-10-17 PROCEDURE — G8400 PT W/DXA NO RESULTS DOC: HCPCS | Performed by: INTERNAL MEDICINE

## 2022-10-17 PROCEDURE — 1123F ACP DISCUSS/DSCN MKR DOCD: CPT | Performed by: INTERNAL MEDICINE

## 2022-10-17 PROCEDURE — G8427 DOCREV CUR MEDS BY ELIG CLIN: HCPCS | Performed by: INTERNAL MEDICINE

## 2022-10-17 PROCEDURE — 3017F COLORECTAL CA SCREEN DOC REV: CPT | Performed by: INTERNAL MEDICINE

## 2022-10-17 PROCEDURE — 1090F PRES/ABSN URINE INCON ASSESS: CPT | Performed by: INTERNAL MEDICINE

## 2022-10-17 PROCEDURE — G8420 CALC BMI NORM PARAMETERS: HCPCS | Performed by: INTERNAL MEDICINE

## 2022-10-17 PROCEDURE — G8484 FLU IMMUNIZE NO ADMIN: HCPCS | Performed by: INTERNAL MEDICINE

## 2022-10-17 PROCEDURE — 99214 OFFICE O/P EST MOD 30 MIN: CPT | Performed by: INTERNAL MEDICINE

## 2022-10-17 PROCEDURE — 93000 ELECTROCARDIOGRAM COMPLETE: CPT | Performed by: INTERNAL MEDICINE

## 2022-10-17 RX ORDER — ATORVASTATIN CALCIUM 80 MG/1
80 TABLET, FILM COATED ORAL DAILY
Qty: 90 TABLET | Refills: 1 | Status: SHIPPED | OUTPATIENT
Start: 2022-10-17

## 2022-10-17 NOTE — PATIENT INSTRUCTIONS
Patient is counseled for smoking cessation and refraining from passive nicotine exposure. I have offered multiple approaches and support to accomplish this. Resume Lipitor 80 mg daily. Primary/secondary prevention is the goal by aggressive risk modification, healthy and therapeutic life style changes for cardiovascular risk reduction. Various goals are discussed and questions answered. Appropriate prescriptions if needed on this visit are addressed. After visit summery is provided. Questions answered and patient verbalizes understanding. Follow up in 6 months with repeat lipids,  sooner if needed.

## 2022-10-17 NOTE — PROGRESS NOTES
Jimmy Wilcox  1955  Francia Rubio MD      Chief Complaint   Patient presents with    Hyperlipidemia    Hypertension    Coronary Artery Disease     Denies any new onset cardiac complaints. For exercise she stays busy. Drinks 1 cup decafe coffee daily. Smokes 1/2 a pack a day. Has not been taking Lipitor due to price     Chief complaint and HPI:  Jimmy Wilcox  is a 69 y.o. female following up known coronary artery disease status post PCI's in 2009 and has ongoing tobacco abuse and hypertension and hyperlipidemia. She denies any cardiac symptoms. She has declined any objective evaluations and had not had any for the last 10 years and stays active in the spine. She takes care of 51-year-old father she lives with who is fairly demanding and she has a brother who does not drive he also helps her when she needs help to take care of her father. She smokes half a pack of cigarettes a day and says it is because of the stress she is under. She does not exercise regularly. She is compliant to her medications except she stopped taking atorvastatin because of the price apparently [facility] was charging her more than $200 per month for 90-day supply which she could not afford. Rest of the Cardiovascular system review is otherwise unchanged from prior encounter. Past medical history:  has a past medical history of Anxiety, CAD (coronary artery disease), H/O cardiovascular stress test, H/O echocardiogram, H/O echocardiogram, Hyperlipidemia, Hypertension, MI (myocardial infarction) ([address] S/P PTCA (percutaneous transluminal coronary angioplasty), and Tobacco abuse. Past surgical history:  has a past surgical history that includes Coronary angioplasty with stent; Coronary angioplasty; Hysterectomy; Tubal ligation; Diagnostic Cardiac Cath Lab Procedure; and Hemorrhoid surgery.   Social History:   Social History     Tobacco Use    Smoking status: Some Days     Packs/day: 0.50     Years: 25.00     Pack years: 12.50 Types: Cigarettes     Start date: 2014     Last attempt to quit: 2016     Years since quittin.0    Smokeless tobacco: Never    Tobacco comments:     Currently using vapor        Reviewed 17   Substance Use Topics    Alcohol use: Yes     Comment: occasionally     Family history: family history includes Substance Abuse in her mother. ALLERGIES:  Penicillins  Prior to Admission medications    Medication Sig Start Date End Date Taking? Authorizing Provider   atorvastatin (LIPITOR) 80 MG tablet Take 1 tablet by mouth daily 10/17/22  Yes Harriett Faye MD   lisinopril (PRINIVIL;ZESTRIL) 2.5 MG tablet Take 1 tablet by mouth daily 22  Yes Harriett Faye MD   metoprolol tartrate (LOPRESSOR) 25 MG tablet Take 0.5 tablets by mouth 2 times daily 22  Yes Harriett Faye MD   nitroGLYCERIN (NITROSTAT) 0.4 MG SL tablet Place 1 tablet under the tongue every 5 minutes as needed for Chest pain 19  Yes Harriett Faye MD   aspirin 81 MG tablet Take 162 mg by mouth daily   Yes Historical Provider, MD     Vitals:    10/17/22 1118   BP: 118/64   Pulse: 75   Weight: 143 lb (64.9 kg)   Height: 5' 7.2\" (1.707 m)      Body mass index is 22.26 kg/m². Wt Readings from Last 3 Encounters:   10/17/22 143 lb (64.9 kg)   22 143 lb (64.9 kg)   10/12/21 151 lb (68.5 kg)     Constitutional:  Patient is normally built and nourished female in no apparent distress. She lost 8 pounds since last visit 6 months ago. HEENT: Wearing glasses and facemask. Cardiovascular: Auscultation: Normal S1 and S2. No murmurs or gallops noted. Carotids are negative for bruits. Respiratory:  Respiratory effort is normal. Breath sounds are clear to auscultation. Extremities:  No edema, clubbing,  Cyanosis, petechiae. Abdomen:  No masses or tenderness. No organomegaly noted. Neurologic:  Oriented to time, place, and person and non-anxious. No focal neurological deficit noted. Psychiatric: Normal mood and effect. EKG today is consistent with normal sinus rhythm 75 bpm essentially normal EKG. LAB REVIEW:  CBC:   Lab Results   Component Value Date/Time    WBC 6.7 09/02/2016 06:02 AM    HGB 12.2 09/02/2016 06:02 AM    HCT 39.0 09/02/2016 06:02 AM     09/02/2016 06:02 AM     Lipids:   Lab Results   Component Value Date    CHOL 149 03/20/2017    CHOLFAST 145 04/30/2021    TRIG 140 03/20/2017    TRIGLYCFAST 144 04/30/2021    HDL 40 (L) 04/30/2021     Renal:   Lab Results   Component Value Date/Time    BUN 11 03/20/2017 11:40 AM    CREATININE 0.9 03/20/2017 11:40 AM     03/20/2017 11:40 AM    K 3.7 03/20/2017 11:40 AM     PT/INR:   Lab Results   Component Value Date/Time    INR 0.94 01/17/2013 01:50 PM     IMPRESSION and RECOMMENDATIONS:      1. Primary hypertension  Assessment & Plan:  Well-controlled on current medications continue the same. Orders:  -     EKG 12 lead; Future  2. Pure hypercholesterolemia  Assessment & Plan:  She had stopped taking Lipitor because of the cost.  We looked around the cost and she could get the medication [facility] cheaper and affordable price is another local pharmacies and we have given her the information and she  will follow through. Orders:  -     Lipid Panel; Standing  -     Comprehensive Metabolic Panel; Future  3. S/P LAD and Cx stents 2009  Assessment & Plan:  Clinically stable. She declines any evaluation. Continue aggressive risk factor modification for secondary prevention. 4. Tobacco abuse  Assessment & Plan:  She is counseled once again for smoking cessation and help was offered. Patient is counseled for smoking cessation and refraining from passive nicotine exposure. I have offered multiple approaches and support to accomplish this. Resume Lipitor 80 mg daily. Primary/secondary prevention is the goal by aggressive risk modification, healthy and therapeutic life style changes for cardiovascular risk reduction.  Various goals are discussed and questions answered. Appropriate prescriptions if needed on this visit are addressed. After visit summery is provided. Questions answered and patient verbalizes understanding. Follow up in 6 months with repeat lipids,  sooner if needed. Daniel Hernandez MD, 10/17/2022 12:36 PM     Please note this report has been partially produced using speech recognition software and may contain errors related to that system including errors in grammar, punctuation, and spelling, as well as words and phrases that may be inappropriate. If there are any questions or concerns please feel free to contact the dictating provider for clarification.

## 2022-10-17 NOTE — ASSESSMENT & PLAN NOTE
Clinically stable. She declines any evaluation. Continue aggressive risk factor modification for secondary prevention.

## 2022-10-17 NOTE — ASSESSMENT & PLAN NOTE
She had stopped taking Lipitor because of the cost.  We looked around the cost and she could get the medication Ull[place] cheaper and affordable price is another local pharmacies and we have given her the information and she  will follow through.

## 2022-12-13 RX ORDER — LISINOPRIL 2.5 MG/1
TABLET ORAL
Qty: 90 TABLET | Refills: 1 | Status: SHIPPED | OUTPATIENT
Start: 2022-12-13

## 2023-04-06 RX ORDER — LISINOPRIL 2.5 MG/1
TABLET ORAL
Qty: 90 TABLET | Refills: 1 | Status: SHIPPED | OUTPATIENT
Start: 2023-04-06

## 2023-05-16 ENCOUNTER — OFFICE VISIT (OUTPATIENT)
Dept: CARDIOLOGY CLINIC | Age: 68
End: 2023-05-16
Payer: MEDICARE

## 2023-05-16 VITALS
BODY MASS INDEX: 23.13 KG/M2 | SYSTOLIC BLOOD PRESSURE: 120 MMHG | HEART RATE: 72 BPM | WEIGHT: 147.4 LBS | HEIGHT: 67 IN | DIASTOLIC BLOOD PRESSURE: 62 MMHG

## 2023-05-16 DIAGNOSIS — E78.00 PURE HYPERCHOLESTEROLEMIA: ICD-10-CM

## 2023-05-16 DIAGNOSIS — I10 PRIMARY HYPERTENSION: ICD-10-CM

## 2023-05-16 DIAGNOSIS — Z72.0 TOBACCO ABUSE: ICD-10-CM

## 2023-05-16 DIAGNOSIS — Z98.61 S/P PTCA (PERCUTANEOUS TRANSLUMINAL CORONARY ANGIOPLASTY): Primary | ICD-10-CM

## 2023-05-16 PROCEDURE — G8427 DOCREV CUR MEDS BY ELIG CLIN: HCPCS | Performed by: INTERNAL MEDICINE

## 2023-05-16 PROCEDURE — 3017F COLORECTAL CA SCREEN DOC REV: CPT | Performed by: INTERNAL MEDICINE

## 2023-05-16 PROCEDURE — 1090F PRES/ABSN URINE INCON ASSESS: CPT | Performed by: INTERNAL MEDICINE

## 2023-05-16 PROCEDURE — 4004F PT TOBACCO SCREEN RCVD TLK: CPT | Performed by: INTERNAL MEDICINE

## 2023-05-16 PROCEDURE — G8420 CALC BMI NORM PARAMETERS: HCPCS | Performed by: INTERNAL MEDICINE

## 2023-05-16 PROCEDURE — G8400 PT W/DXA NO RESULTS DOC: HCPCS | Performed by: INTERNAL MEDICINE

## 2023-05-16 PROCEDURE — 1123F ACP DISCUSS/DSCN MKR DOCD: CPT | Performed by: INTERNAL MEDICINE

## 2023-05-16 PROCEDURE — 99214 OFFICE O/P EST MOD 30 MIN: CPT | Performed by: INTERNAL MEDICINE

## 2023-05-16 PROCEDURE — 3074F SYST BP LT 130 MM HG: CPT | Performed by: INTERNAL MEDICINE

## 2023-05-16 PROCEDURE — 3078F DIAST BP <80 MM HG: CPT | Performed by: INTERNAL MEDICINE

## 2023-05-16 RX ORDER — ATORVASTATIN CALCIUM 80 MG/1
80 TABLET, FILM COATED ORAL DAILY
Qty: 90 TABLET | Refills: 3 | Status: SHIPPED | OUTPATIENT
Start: 2023-05-16

## 2023-05-16 RX ORDER — LISINOPRIL 2.5 MG/1
2.5 TABLET ORAL DAILY
Qty: 90 TABLET | Refills: 3 | Status: SHIPPED | OUTPATIENT
Start: 2023-05-16

## 2023-05-16 NOTE — PATIENT INSTRUCTIONS
Continue current cardiovascular medications which have been reviewed and discussed individually with you. Counseled for smoking. Primary/secondary prevention is the goal by aggressive risk modification, healthy and therapeutic life style changes for cardiovascular risk reduction. Various goals are discussed and questions answered. Appropriate prescriptions if needed on this visit are addressed. After visit summery is provided. Questions answered and patient verbalizes understanding. Follow up in 6 months with lipids,  sooner if needed.

## 2023-05-16 NOTE — ASSESSMENT & PLAN NOTE
Continues to smoke half a pack of cigarettes a day. We discussed starting her on Chantix she does not want to try the medication and she would try the patches with nicotine.

## 2023-05-16 NOTE — PROGRESS NOTES
Benny Chowdhury  1955  Milena Aguilar MD      Chief Complaint   Patient presents with    Hyperlipidemia    Hypertension    Coronary Artery Disease     No chest pain, SOB or dizziness, swelling or palpitations    Currently not taking atorvastatin but if youd like her to be on it will need it called in      Chief complaint and HPI:  Benny Chowdhury  is a 77 y.o. female following up for known coronary artery disease and has hypertension and hyperlipidemia and ongoing tobacco abuse. She still smokes half a pack of cigarettes a day. 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 She denies any cardiac symptoms. She does not exercise. She is not taking Lipitor because it was costing her too much now she has BDA and she is willing to try it again. Rest of the Cardiovascular system review is otherwise unchanged from prior encounter. Past medical history:  has a past medical history of Anxiety, CAD (coronary artery disease), H/O cardiovascular stress test, H/O echocardiogram, H/O echocardiogram, Hyperlipidemia, Hypertension, MI (myocardial infarction) ([facility] [address] S/P PTCA (percutaneous transluminal coronary angioplasty), and Tobacco abuse. Past surgical history:  has a past surgical history that includes Coronary angioplasty with stent; Coronary angioplasty; Hysterectomy; Tubal ligation; Diagnostic Cardiac Cath Lab Procedure; and Hemorrhoid surgery.   Social History:   Social History     Tobacco Use    Smoking status: Some Days     Packs/day: 0.50     Years: 25.00     Pack years: 12.50     Types: Cigarettes     Start date: 2014     Last attempt to quit: 2016     Years since quittin.6    Smokeless tobacco: Never    Tobacco comments:     Currently using vapor        Reviewed 17   Substance Use Topics    Alcohol use: Yes     Comment: occasionally     Family history: family history includes Substance Abuse

## 2023-05-16 NOTE — ASSESSMENT & PLAN NOTE
She has not been taking Lipitor. She is going to start taking it and we gave her a prescription and we will check lipids in 2 to 3 months after she is on the medications.

## 2023-11-14 ENCOUNTER — OFFICE VISIT (OUTPATIENT)
Dept: CARDIOLOGY CLINIC | Age: 68
End: 2023-11-14

## 2023-11-14 VITALS
DIASTOLIC BLOOD PRESSURE: 72 MMHG | WEIGHT: 141 LBS | SYSTOLIC BLOOD PRESSURE: 120 MMHG | BODY MASS INDEX: 23.49 KG/M2 | HEART RATE: 75 BPM | HEIGHT: 65 IN

## 2023-11-14 DIAGNOSIS — Z98.61 S/P PTCA (PERCUTANEOUS TRANSLUMINAL CORONARY ANGIOPLASTY): ICD-10-CM

## 2023-11-14 DIAGNOSIS — E78.00 PURE HYPERCHOLESTEROLEMIA: ICD-10-CM

## 2023-11-14 DIAGNOSIS — Z72.0 TOBACCO ABUSE: ICD-10-CM

## 2023-11-14 DIAGNOSIS — I25.10 CORONARY ARTERY DISEASE INVOLVING NATIVE HEART WITHOUT ANGINA PECTORIS, UNSPECIFIED VESSEL OR LESION TYPE: Primary | ICD-10-CM

## 2023-11-14 DIAGNOSIS — I10 PRIMARY HYPERTENSION: ICD-10-CM

## 2023-11-14 NOTE — ASSESSMENT & PLAN NOTE
Last LDL was 89 from 2021 and she was not able to afford statin at that time. However now she is taking atorvastatin and we will repeat it again. LDL goal is less than 70.

## 2023-11-14 NOTE — PROGRESS NOTES
Gerald Rosenberg  1955  Allie Thomson MD      Chief Complaint   Patient presents with    Coronary Artery Disease    Hypertension    Hyperlipidemia     OV for 6 month check. Pt denies any chest pain,SOB,dizziness,swelling to ankles, or palpitations. Chief complaint and HPI:  Gerald Rosenberg  is a 77 y.o. female following up for known coronary artery disease, hypertension hyperlipidemia and ongoing tobacco abuse. Her father passed away in August of this year. She continues to smoke a pack of cigarettes a day. She walks for exercise and denies any chest pains or shortness of breath or fatigue. She has been compliant to her medications and they have been reviewed and reconciled. Rest of the Cardiovascular system review is otherwise unchanged from prior encounter. Past medical history:  has a past medical history of Anxiety, CAD S/P PTCA (percutaneous transluminal coronary angioplasty), H/O cardiovascular stress test, H/O echocardiogram, H/O echocardiogram, Hyperlipidemia, Hypertension, MI (myocardial infarction) ([address]), and Tobacco abuse. Past surgical history:  has a past surgical history that includes Coronary angioplasty with stent; Hysterectomy; Tubal ligation; Diagnostic Cardiac Cath Lab Procedure; and Hemorrhoid surgery. Social History:   Social History     Tobacco Use    Smoking status: Every Day     Packs/day: 0.50     Years: 25.00     Additional pack years: 0.00     Total pack years: 12.50     Types: Cigarettes     Start date: 2014     Last attempt to quit: 2016     Years since quittin.1    Smokeless tobacco: Never    Tobacco comments:     Currently using vapor        Reviewed 17   Substance Use Topics    Alcohol use: Yes     Comment: occasionally     Family history: family history includes Substance Abuse in her mother. ALLERGIES:  Penicillins  Prior to Admission medications    Medication Sig Start Date End Date Taking?  Authorizing Provider   atorvastatin (LIPITOR) 80 MG

## 2023-11-14 NOTE — PATIENT INSTRUCTIONS
Continue current cardiovascular medications which have been reviewed and discussed individually with you. Patient is counseled for smoking cessation and refraining from passive nicotine exposure. I have offered multiple approaches and support to accomplish this. Check lipids and LFT. Primary/secondary prevention is the goal by aggressive risk modification, healthy and therapeutic life style changes for cardiovascular risk reduction. Various goals are discussed and questions answered. Appropriate prescriptions if needed on this visit are addressed. After visit summery is provided. Questions answered and patient verbalizes understanding. Follow up in 6 months,  sooner if needed.

## 2024-03-18 RX ORDER — LISINOPRIL 2.5 MG/1
2.5 TABLET ORAL DAILY
Qty: 90 TABLET | Refills: 3 | Status: SHIPPED | OUTPATIENT
Start: 2024-03-18

## 2024-05-14 ENCOUNTER — OFFICE VISIT (OUTPATIENT)
Dept: CARDIOLOGY CLINIC | Age: 69
End: 2024-05-14
Payer: MEDICARE

## 2024-05-14 VITALS
HEART RATE: 80 BPM | BODY MASS INDEX: 21.86 KG/M2 | HEIGHT: 66 IN | WEIGHT: 136 LBS | SYSTOLIC BLOOD PRESSURE: 136 MMHG | DIASTOLIC BLOOD PRESSURE: 70 MMHG

## 2024-05-14 DIAGNOSIS — I10 PRIMARY HYPERTENSION: ICD-10-CM

## 2024-05-14 DIAGNOSIS — Z98.61 S/P PTCA (PERCUTANEOUS TRANSLUMINAL CORONARY ANGIOPLASTY): Primary | ICD-10-CM

## 2024-05-14 DIAGNOSIS — Z72.0 TOBACCO ABUSE: ICD-10-CM

## 2024-05-14 DIAGNOSIS — E78.00 PURE HYPERCHOLESTEROLEMIA: ICD-10-CM

## 2024-05-14 PROCEDURE — 99214 OFFICE O/P EST MOD 30 MIN: CPT | Performed by: INTERNAL MEDICINE

## 2024-05-14 PROCEDURE — 3078F DIAST BP <80 MM HG: CPT | Performed by: INTERNAL MEDICINE

## 2024-05-14 PROCEDURE — 3075F SYST BP GE 130 - 139MM HG: CPT | Performed by: INTERNAL MEDICINE

## 2024-05-14 PROCEDURE — 1123F ACP DISCUSS/DSCN MKR DOCD: CPT | Performed by: INTERNAL MEDICINE

## 2024-05-14 RX ORDER — ATORVASTATIN CALCIUM 80 MG/1
80 TABLET, FILM COATED ORAL DAILY
Qty: 90 TABLET | Refills: 3 | Status: SHIPPED | OUTPATIENT
Start: 2024-05-14

## 2024-05-14 NOTE — PROGRESS NOTES
Kimberly Hoffman  1955  Jonathon Garrido MD      Chief Complaint   Patient presents with    Coronary Artery Disease    Hypertension    Hyperlipidemia     OV for 6 month check. Pt denies any chest pain,SOB,dizziness,swelling to ankles, or palpitations.     Chief complaint and HPI:  Kimberly Hoffman  is a 69 y.o. female following up for known coronary artery disease and hypertension hyperlipidemia and ongoing tobacco use.  She is smoking 10 to 15 cigarettes a day and trying to wean herself off slowly.  She is compliant to her medications.  She does not exercise.  Denies any chest pains or shortness of breath.    Rest of the Cardiovascular system review is otherwise unchanged from prior encounter.  Past medical history:  has a past medical history of Anxiety, CAD S/P PTCA (percutaneous transluminal coronary angioplasty), H/O cardiovascular stress test, H/O echocardiogram, H/O echocardiogram, Hyperlipidemia, Hypertension, MI (myocardial infarction) ([facility]), and Tobacco abuse.  Past surgical history:  has a past surgical history that includes Coronary angioplasty with stent; Hysterectomy; Tubal ligation; Diagnostic Cardiac Cath Lab Procedure; and Hemorrhoid surgery.  Social History:   Social History     Tobacco Use    Smoking status: Every Day     Current packs/day: 0.00     Average packs/day: 0.5 packs/day for 25.0 years (12.5 ttl pk-yrs)     Types: Cigarettes     Start date: 2014     Last attempt to quit: 2016     Years since quittin.6    Smokeless tobacco: Never    Tobacco comments:     Currently using vapor        Reviewed 17   Substance Use Topics    Alcohol use: Yes     Comment: occasionally     Family history: family history includes Substance Abuse in her mother.  ALLERGIES:  Penicillins  Prior to Admission medications    Medication Sig Start Date End Date Taking? Authorizing Provider   atorvastatin (LIPITOR) 80 MG tablet Take 1 tablet by mouth daily 24  Yes Zohra Palomo MD

## 2024-05-14 NOTE — ASSESSMENT & PLAN NOTE
Fairly well-controlled on current medications continue the same.  She is on lisinopril and Toprol.

## 2024-05-14 NOTE — ASSESSMENT & PLAN NOTE
Patient did not have her lipids done since 2017 and forgot to have it done this morning because she had already had a coffee with creamer and sugar.  She states she will have it done tomorrow.  She is on  80 mg of atorvastatin daily continue the same.  LDL goal is less than 70.

## 2024-05-14 NOTE — ASSESSMENT & PLAN NOTE
Counseled once again for smoking cessation and she is saying that she is slowly weaning herself off.

## 2024-05-14 NOTE — PATIENT INSTRUCTIONS
Continue current cardiovascular medications which have been reviewed and discussed individually with you.  Patient is counseled for smoking cessation and refraining from passive nicotine exposure. I have offered multiple approaches and support to accomplish this.   Counseled for 30 minutes a day of moderate intensity aerobic exercise like activity.  Appropriate prescriptions if needed on this visit are addressed. After visit summery is provided.   Questions answered and patient verbalizes understanding. Follow up in 6 months,  sooner if needed.

## 2024-11-26 ENCOUNTER — OFFICE VISIT (OUTPATIENT)
Dept: CARDIOLOGY CLINIC | Age: 69
End: 2024-11-26
Payer: MEDICARE

## 2024-11-26 VITALS
DIASTOLIC BLOOD PRESSURE: 82 MMHG | BODY MASS INDEX: 22.53 KG/M2 | HEART RATE: 76 BPM | SYSTOLIC BLOOD PRESSURE: 126 MMHG | WEIGHT: 140.2 LBS | HEIGHT: 66 IN

## 2024-11-26 DIAGNOSIS — Z72.0 TOBACCO ABUSE: ICD-10-CM

## 2024-11-26 DIAGNOSIS — Z98.61 S/P PTCA (PERCUTANEOUS TRANSLUMINAL CORONARY ANGIOPLASTY): Primary | ICD-10-CM

## 2024-11-26 DIAGNOSIS — I10 PRIMARY HYPERTENSION: ICD-10-CM

## 2024-11-26 DIAGNOSIS — E78.00 PURE HYPERCHOLESTEROLEMIA: ICD-10-CM

## 2024-11-26 PROCEDURE — 3079F DIAST BP 80-89 MM HG: CPT | Performed by: INTERNAL MEDICINE

## 2024-11-26 PROCEDURE — 99214 OFFICE O/P EST MOD 30 MIN: CPT | Performed by: INTERNAL MEDICINE

## 2024-11-26 PROCEDURE — 1123F ACP DISCUSS/DSCN MKR DOCD: CPT | Performed by: INTERNAL MEDICINE

## 2024-11-26 PROCEDURE — 1159F MED LIST DOCD IN RCRD: CPT | Performed by: INTERNAL MEDICINE

## 2024-11-26 PROCEDURE — 3074F SYST BP LT 130 MM HG: CPT | Performed by: INTERNAL MEDICINE

## 2024-11-26 RX ORDER — ATORVASTATIN CALCIUM 80 MG/1
80 TABLET, FILM COATED ORAL DAILY
Qty: 90 TABLET | Refills: 3 | Status: SHIPPED | OUTPATIENT
Start: 2024-11-26

## 2024-11-26 RX ORDER — NITROGLYCERIN 0.4 MG/1
0.4 TABLET SUBLINGUAL EVERY 5 MIN PRN
Qty: 25 TABLET | Refills: 3 | Status: SHIPPED | OUTPATIENT
Start: 2024-11-26

## 2024-11-26 RX ORDER — LISINOPRIL 2.5 MG/1
2.5 TABLET ORAL DAILY
Qty: 90 TABLET | Refills: 3 | Status: SHIPPED | OUTPATIENT
Start: 2024-11-26

## 2024-11-26 RX ORDER — METOPROLOL TARTRATE 25 MG/1
12.5 TABLET, FILM COATED ORAL 2 TIMES DAILY
Qty: 90 TABLET | Refills: 3 | Status: SHIPPED | OUTPATIENT
Start: 2024-11-26

## 2024-11-26 NOTE — ASSESSMENT & PLAN NOTE
She is asked to resume her atorvastatin 80 mg daily and repeat lipids.  She had not had lipids in since 2021.  We will also check her LFTs on medications in 3 months.

## 2024-11-26 NOTE — PROGRESS NOTES
Kimberly Hoffman  1955  Jonathon Garrido MD      Chief Complaint   Patient presents with    Hypertension    Hyperlipidemia    Follow-up     6 month follow up,  Not taking statin due to price of medication couldn't afford it at the time.  No chest pains, SOB dizziness, swelling or palpitations,also has developed a cough .      Chief complaint and HPI:  Kimberly Hoffman  is a 69 y.o. female following up for known coronary artery disease and tobacco abuse has hypertension hyperlipidemia.  She denies any cardiac symptoms today.  She started walking her dog 3 times a week and feels better.  She continues to smoke half pack of cigarette daily.  She is not taking atorvastatin because when she had the prescription filled she did not have money to pay and after that she went back to the pharmacy and they had put it back on the shelf and she did not ask for it again.  She had not had lipids done also which we had ordered before.  Medications reviewed and reconciled.    Rest of the Cardiovascular system review is otherwise unchanged from prior encounter.  Past medical history:  has a past medical history of Anxiety, CAD S/P PTCA (percutaneous transluminal coronary angioplasty), H/O cardiovascular stress test, H/O echocardiogram, H/O echocardiogram, Hyperlipidemia, Hypertension, MI (myocardial infarction) (HCC), and Tobacco abuse.  Past surgical history:  has a past surgical history that includes Coronary angioplasty with stent; Hysterectomy; Tubal ligation; Diagnostic Cardiac Cath Lab Procedure; and Hemorrhoid surgery.  Social History:   Social History     Tobacco Use    Smoking status: Every Day     Current packs/day: 0.00     Average packs/day: 0.5 packs/day for 25.0 years (12.5 ttl pk-yrs)     Types: Cigarettes     Start date: 2014     Last attempt to quit: 2016     Years since quittin.1    Smokeless tobacco: Never    Tobacco comments:     Currently using vapor        Reviewed 17   Substance Use Topics

## 2024-11-26 NOTE — PATIENT INSTRUCTIONS
Continue current cardiovascular medications which have been reviewed and discussed individually with you.  Patient is counseled for smoking cessation and refraining from passive nicotine exposure. I have offered multiple approaches and support to accomplish this.   Counseled for 30 minutes a day of moderate intensity aerobic exercise like activity.  Primary/secondary prevention is the goal by aggressive risk modification, healthy and therapeutic life style changes for cardiovascular risk reduction. Various goals are discussed and questions answered.  Appropriate prescriptions if needed on this visit are addressed. After visit summery is provided.   Questions answered and patient verbalizes understanding. Follow up in 6 months with repeat lipids and LFT,  sooner if needed.